# Patient Record
Sex: FEMALE | Race: OTHER | HISPANIC OR LATINO | URBAN - METROPOLITAN AREA
[De-identification: names, ages, dates, MRNs, and addresses within clinical notes are randomized per-mention and may not be internally consistent; named-entity substitution may affect disease eponyms.]

---

## 2024-02-20 ENCOUNTER — EMERGENCY (EMERGENCY)
Facility: HOSPITAL | Age: 24
LOS: 0 days | Discharge: HOME | End: 2024-02-21
Attending: EMERGENCY MEDICINE
Payer: SELF-PAY

## 2024-02-20 VITALS
HEART RATE: 90 BPM | HEIGHT: 66.93 IN | SYSTOLIC BLOOD PRESSURE: 134 MMHG | WEIGHT: 130.07 LBS | TEMPERATURE: 98 F | RESPIRATION RATE: 24 BRPM | DIASTOLIC BLOOD PRESSURE: 87 MMHG | OXYGEN SATURATION: 99 %

## 2024-02-20 DIAGNOSIS — R52 PAIN, UNSPECIFIED: ICD-10-CM

## 2024-02-20 DIAGNOSIS — F11.13 OPIOID ABUSE WITH WITHDRAWAL: ICD-10-CM

## 2024-02-20 DIAGNOSIS — F19.10 OTHER PSYCHOACTIVE SUBSTANCE ABUSE, UNCOMPLICATED: ICD-10-CM

## 2024-02-20 PROCEDURE — 99284 EMERGENCY DEPT VISIT MOD MDM: CPT

## 2024-02-20 PROCEDURE — 99283 EMERGENCY DEPT VISIT LOW MDM: CPT

## 2024-02-20 PROCEDURE — 82962 GLUCOSE BLOOD TEST: CPT

## 2024-02-20 RX ORDER — BUPRENORPHINE AND NALOXONE 2; .5 MG/1; MG/1
1 TABLET SUBLINGUAL ONCE
Refills: 0 | Status: DISCONTINUED | OUTPATIENT
Start: 2024-02-20 | End: 2024-02-20

## 2024-02-20 RX ADMIN — BUPRENORPHINE AND NALOXONE 1 TABLET(S): 2; .5 TABLET SUBLINGUAL at 22:49

## 2024-02-20 NOTE — ED PROVIDER NOTE - OBJECTIVE STATEMENT
23-year-old female with no past medical history states she has been taking Oxycodone 30 to 40 mg a day for the last year buys it off the street is not prescribed.  Denies taking any other illicit substances injection oral snorting lies.  Denies taking over-the-counter prescription medications either.  Denies alcohol use denies hallucinations deny suicidal or homicidal ideation.  Patient main complaint is body aches  chills sweats and overall malaise.

## 2024-02-20 NOTE — ED PROVIDER NOTE - CARE PLAN
Assessment and plan of treatment:	Substance abuse opioid dependence/withdrawal  Supportive care   1 Principal Discharge DX:	Opioid withdrawal  Assessment and plan of treatment:	Substance abuse opioid dependence/withdrawal  Supportive care  Secondary Diagnosis:	Active substance abuse

## 2024-02-20 NOTE — ED PROVIDER NOTE - PATIENT PORTAL LINK FT
You can access the FollowMyHealth Patient Portal offered by [facility] by registering at the following website: [email or web address]. By joining [employer]’s FollowMyHealth portal, you will also be able to view your health information using other applications (apps) compatible with our system.

## 2024-02-20 NOTE — ED ADULT TRIAGE NOTE - CHIEF COMPLAINT QUOTE
She take percocet every day, she hasn't had one for three days, she feels cramping all over and her muscles hurt - family member  Patient anxious, hyperventilating in triage, states she takes one to two Percocet /day for last few months

## 2024-02-20 NOTE — ED PROVIDER NOTE - NSFOLLOWUPCLINICS_GEN_ALL_ED_FT
[facility]  [facility]  [address] [place] [address]  Phone: [phone]  Fax: